# Patient Record
Sex: FEMALE | Race: BLACK OR AFRICAN AMERICAN | NOT HISPANIC OR LATINO | Employment: PART TIME | URBAN - METROPOLITAN AREA
[De-identification: names, ages, dates, MRNs, and addresses within clinical notes are randomized per-mention and may not be internally consistent; named-entity substitution may affect disease eponyms.]

---

## 2017-03-21 ENCOUNTER — HISTORICAL (OUTPATIENT)
Dept: RADIOLOGY | Facility: HOSPITAL | Age: 47
End: 2017-03-21

## 2022-04-11 ENCOUNTER — HISTORICAL (OUTPATIENT)
Dept: ADMINISTRATIVE | Facility: HOSPITAL | Age: 52
End: 2022-04-11
Payer: MEDICAID

## 2022-04-27 VITALS
BODY MASS INDEX: 23.79 KG/M2 | WEIGHT: 156.94 LBS | SYSTOLIC BLOOD PRESSURE: 126 MMHG | HEIGHT: 68 IN | DIASTOLIC BLOOD PRESSURE: 78 MMHG

## 2022-06-03 ENCOUNTER — HOSPITAL ENCOUNTER (EMERGENCY)
Facility: HOSPITAL | Age: 52
Discharge: HOME | End: 2022-06-03
Payer: MEDICAID

## 2022-06-03 VITALS
HEIGHT: 70 IN | HEART RATE: 62 BPM | BODY MASS INDEX: 23.62 KG/M2 | SYSTOLIC BLOOD PRESSURE: 178 MMHG | DIASTOLIC BLOOD PRESSURE: 92 MMHG | WEIGHT: 165 LBS | OXYGEN SATURATION: 99 % | RESPIRATION RATE: 17 BRPM

## 2022-06-03 DIAGNOSIS — H10.89 GIANT PAPILLARY CONJUNCTIVITIS: Primary | ICD-10-CM

## 2022-06-03 PROCEDURE — 25000003 PHARM REV CODE 250: Performed by: STUDENT IN AN ORGANIZED HEALTH CARE EDUCATION/TRAINING PROGRAM

## 2022-06-03 PROCEDURE — 99283 EMERGENCY DEPT VISIT LOW MDM: CPT

## 2022-06-03 RX ORDER — PROPARACAINE HYDROCHLORIDE 5 MG/ML
1 SOLUTION/ DROPS OPHTHALMIC
Status: COMPLETED | OUTPATIENT
Start: 2022-06-03 | End: 2022-06-03

## 2022-06-03 RX ORDER — NEOMYCIN SULFATE, POLYMYXIN B SULFATE, AND DEXAMETHASONE 3.5; 10000; 1 MG/G; [USP'U]/G; MG/G
OINTMENT OPHTHALMIC EVERY 6 HOURS
Qty: 3.5 G | Refills: 0 | Status: SHIPPED | OUTPATIENT
Start: 2022-06-03 | End: 2022-06-07

## 2022-06-03 RX ADMIN — PROPARACAINE HYDROCHLORIDE 1 DROP: 5 SOLUTION/ DROPS OPHTHALMIC at 03:06

## 2022-06-03 RX ADMIN — FLUORESCEIN SODIUM 1 EACH: 1 STRIP OPHTHALMIC at 03:06

## 2022-06-03 NOTE — ED PROVIDER NOTES
Encounter Date: 6/3/2022       History     Chief Complaint   Patient presents with    Eye Pain     Pt states woke up this morning c/o cloudy vision in both eyes.  States put contacts into eyes this morning and started to have burning in both eyes. Denies any trauma, states feels like something is in both of her eyes.       HPI  Review of patient's allergies indicates:  No Known Allergies  Past Medical History:   Diagnosis Date    Cervical cancer      Past Surgical History:   Procedure Laterality Date     SECTION, CLASSIC      HYSTERECTOMY       History reviewed. No pertinent family history.  Social History     Tobacco Use    Smoking status: Never Smoker    Smokeless tobacco: Never Used   Substance Use Topics    Alcohol use: Never    Drug use: Never     Review of Systems    Physical Exam     Initial Vitals [22 1339]   BP Pulse Resp Temp SpO2   (!) 178/92 62 17 -- 99 %      MAP       --         Physical Exam    ED Course   Procedures  Labs Reviewed - No data to display       Imaging Results    None          Medications   fluorescein ophthalmic strip 1 each (1 each Both Eyes Given 6/3/22 1515)   proparacaine 0.5 % ophthalmic solution 1 drop (1 drop Both Eyes Given 6/3/22 1515)     Medical Decision Making:   Initial Assessment:   Eye injury conjunctivitis  Differential Diagnosis:   Irritant conjunctivitis, bacterial conjunctivitis, joint palpebral conjunctivitis.  Bacterial conjunctivitis.  Contact lens wearer ulcer.  Abrasion from contact lens.  Foreign body in eye.            Attending Attestation:   Physician Attestation Statement for Resident:  As the supervising MD   Physician Attestation Statement: I have personally seen and examined this patient.   I agree with the above history. -: This lady is a contact lens wearer she got up this morning her eyes were burning and itching in hurting contacts new pair been in since about .  She took her contacts out eyes continued to burn she washed her  eyes with saline it helped but eyes is still red injected and burning she has no chronic medical issue she is normally very healthy she has never had a corneal ulcer her visual acuity was corrected is 20 50 in both eyes wearing her eyeglasses.  She denies being around any substances that could have hurt her eyes dust  -: Ing cetera.  No trauma to eyes.  No high-speed rotary objects.  Very pleasant lady GCS 15 visual acuity right eye corrected with eyeglasses 20 50 visual acuity left eye corrected with eyeglasses 20 50.  The patient has a pressure in the right eye of 23 and a pressure in the left eye of 21. She has markedly injected palpebral conjunctiva bilaterally.  Pupils are round and briskly reactive extraocular motions are intact with floor seen and sent lab no obvious foreign bodies are seen there is a rather bumpy pattern to the palpebral conjunctiva.  There is no evidence of any acute posterior injury.  With limited funduscopic exam done on dilated.  Heart is regular rate and rhythm lungs are clear there is no submandibular adenopathy there is no pre clear adenopathy heart is regular rate and rhythm normal TMs nose and oropharynx.     As the supervising MD I agree with the above treatment, course, plan, and disposition.   -: The resident and I discussed differential diagnosis for this ulcer conjunctivitis palpebral conjunctivitis.  Allergic reaction to new substances.  Excedrin we discussed the medical decision making at length.  I have extensive input into the medical decision-making patient will be discharged home after consultation with the ophthalmologist.  Patient will get Maxitrol drops.  Until re-evaluated on Monday morning every 6 hours part of the rib the patient is prepared for release.  Questions are asked and answered.  Impression conjunctivitis bilateral eyes and contact lens wearer.  Patient is discharged in stable condition I have physically seen evaluated the patient I concur with the  documentation as in this chart above.                         Clinical Impression:   Final diagnoses:  [H10.89] Giant papillary conjunctivitis (Primary)          ED Disposition Condition    Discharge Stable        ED Prescriptions     Medication Sig Dispense Start Date End Date Auth. Provider    neomycin-polymyxin-dexamethasone (MAXITROL) 3.5 mg/g-10,000 unit/g-0.1 % Oint Place into both eyes every 6 (six) hours. for 4 days 3.5 g 6/3/2022 6/7/2022 Yulissa Aiken MD        Follow-up Information     Follow up With Specialties Details Why Contact Info    Mali Price MD Ophthalmology Go on 6/6/2022 @ 07:30 [facility] [address] [address]  [phone]             Felice Sanchez MD  06/03/22 3671

## 2022-06-03 NOTE — ED PROVIDER NOTES
Encounter Date: 6/3/2022       History     Chief Complaint   Patient presents with    Eye Pain     Pt states woke up this morning c/o cloudy vision in both eyes.  States put contacts into eyes this morning and started to have burning in both eyes. Denies any trauma, states feels like something is in both of her eyes.       HPI   50yo F presents to the ED complaining of foreign body sensation in the bilateral eyes. Reports waking up this morning with no problems with her eyes. When she placed her contacts in her eyes she began to have a burning sensation and reports her sclera being red, she removed the contacts and reports noticing a white film over her eyes, with mild blurred vision. She placed some saline drops in her eyes with no relief, denies ever having symptoms similar to this before. Usually changes contacts monthly, last changed about 3 days ago, denies sleeping in contacts, denies any new contact solutions and always washes hands prior to inserting contacts.    PMH: No medical Hx per patient  Surg Hx:  Section x1, Hysterectomy  Allergies: NKDA  Meds: No medications at home per patient  Social Hx: Denies EtOH, tobacco, or illicit drug use.    Review of patient's allergies indicates:  No Known Allergies  Past Medical History:   Diagnosis Date    Cervical cancer      Past Surgical History:   Procedure Laterality Date     SECTION, CLASSIC      HYSTERECTOMY       History reviewed. No pertinent family history.  Social History     Tobacco Use    Smoking status: Never Smoker    Smokeless tobacco: Never Used   Substance Use Topics    Alcohol use: Never    Drug use: Never     Review of Systems   Constitutional: Negative for chills and fever.   HENT: Negative for ear discharge, ear pain, sinus pressure and sinus pain.    Eyes: Positive for pain, redness and visual disturbance. Negative for photophobia, discharge and itching.   Respiratory: Negative for shortness of breath.    Cardiovascular:  Negative for chest pain and palpitations.   Gastrointestinal: Negative for nausea and vomiting.   Skin: Negative for rash and wound.   Neurological: Negative for dizziness, syncope, light-headedness and headaches.   All other systems reviewed and are negative.      Physical Exam     Initial Vitals [06/03/22 1339]   BP Pulse Resp Temp SpO2   (!) 178/92 62 17 -- 99 %      MAP       --         Physical Exam    Constitutional: She appears well-developed. No distress.   HENT:   Head: Normocephalic and atraumatic.   Eyes: EOM are normal. Pupils are equal, round, and reactive to light. Lids are everted and swept, no foreign bodies found. Right conjunctiva is injected. Left conjunctiva is injected. Scleral icterus is present.   Fine bumps on superior internal eyelid bilateral with bilateral conjunctival injection  Corrected Visual Acuity: 20/50 bilaterally  Fluorescein Exam: No pooling of fluorescein, no corneal ulcers or abrasions appreciated  Occular Pressure: L 21; R 23  Slit Lamp Exam: cornea clear bilaterally, no clouding     Neck: Neck supple. No thyromegaly present.   Cardiovascular: Normal rate, regular rhythm, normal heart sounds and intact distal pulses.   Pulmonary/Chest: Breath sounds normal. No respiratory distress.   Musculoskeletal:         General: Normal range of motion.      Cervical back: Neck supple.     Lymphadenopathy:     She has no cervical adenopathy.   Neurological: She is alert and oriented to person, place, and time.         ED Course   Procedures  Labs Reviewed - No data to display       Imaging Results    None          Medications   fluorescein ophthalmic strip 1 each (1 each Both Eyes Given 6/3/22 1515)   proparacaine 0.5 % ophthalmic solution 1 drop (1 drop Both Eyes Given 6/3/22 1515)     Medical Decision Making:   Initial Assessment:   50yo F presents for burning and foreign body sensation in bilateral eyes following contact placement. Symptoms improved since taking contacts out.    Differential Diagnosis:   Corneal ulcer, Corneal abrasion, Giant Papillary Conjunctivitis  ED Management:  Patient had visual acuity with corrective lenses 20/50 bilaterally, she had a fluorescein and slit lamp exam that was benign, and occular pressures were just slightly >20 bilaterally. Spoke with ophthalmologist on call Dr. Mali Price, who recommended Maxitrol drops QID in both eyes and to follow up in her office on Monday 06/06/2022 at 07:30                      Clinical Impression:   Final diagnoses:  [H10.89] Giant papillary conjunctivitis (Primary)          ED Disposition Condition    Discharge Stable        ED Prescriptions     None        Follow-up Information     Follow up With Specialties Details Why Contact Info    Mali Price MD Ophthalmology Go on 6/6/2022 @ 07:30 [address] [address]  [phone]             Yulissa Aiken MD  Resident  06/03/22 4657       Yulissa Aiken MD  Resident  06/03/22 9541

## 2022-06-03 NOTE — FIRST PROVIDER EVALUATION
Medical screening exam completed.  I have conducted a focused provider triage encounter, findings are as follows:    Brief history of present illness:  51 year old female presents to ED for cloudy vision to bilateral eyes that began this morning after putting contacts in; Denies injury/trauma     There were no vitals filed for this visit.    Pertinent physical exam:  Alert, awake    Brief workup plan:  Eye exam                                                                                                                                                                    Preliminary workup initiated; this workup will be continued and followed by the physician or advanced practice provider that is assigned to the patient when roomed.

## 2022-10-03 ENCOUNTER — HOSPITAL ENCOUNTER (EMERGENCY)
Facility: HOSPITAL | Age: 52
Discharge: HOME | End: 2022-10-03
Attending: EMERGENCY MEDICINE
Payer: MEDICAID

## 2022-10-03 VITALS
TEMPERATURE: 99 F | RESPIRATION RATE: 16 BRPM | BODY MASS INDEX: 24.96 KG/M2 | HEART RATE: 80 BPM | OXYGEN SATURATION: 98 % | WEIGHT: 164.69 LBS | DIASTOLIC BLOOD PRESSURE: 82 MMHG | HEIGHT: 68 IN | SYSTOLIC BLOOD PRESSURE: 148 MMHG

## 2022-10-03 DIAGNOSIS — I10 HYPERTENSION, UNSPECIFIED TYPE: Primary | ICD-10-CM

## 2022-10-03 PROCEDURE — 99282 EMERGENCY DEPT VISIT SF MDM: CPT

## 2022-10-03 NOTE — DISCHARGE INSTRUCTIONS
Keep scheduled appointment with your PCP as planned.  Present to nearest ED if your pressure is elevated and you have headache, blurred vision, or dizziness.

## 2022-10-03 NOTE — ED PROVIDER NOTES
Encounter Date: 10/3/2022       History     Chief Complaint   Patient presents with    Hypertension     Pt states that she woke up this morning and her BP was in the 180's before she took her medicines. Pt recently diagnosed with hypertension. Pt denies HA or blurred vision.     Pt is a 52 y.o. female who presents to the [facility] ED for evaluation of her blood pressure. Reports being placed on hypertension medication a few weeks ago and noticed that her blood pressure was elevated this AM after taking her medication. Denies SOB, weakness, dizziness, chest pain, blurry vision, or headache. Current /82. Pt reports already having a scheduled follow up with her PCP to evaluate how the medication is working.     Review of patient's allergies indicates:  No Known Allergies  Past Medical History:   Diagnosis Date    Cervical cancer     Hypertension      Past Surgical History:   Procedure Laterality Date     SECTION, CLASSIC      HYSTERECTOMY       No family history on file.  Social History     Tobacco Use    Smoking status: Never    Smokeless tobacco: Never   Substance Use Topics    Alcohol use: Never    Drug use: Never     Review of Systems   Constitutional:  Negative for chills, diaphoresis, fatigue and fever.   HENT:  Negative for facial swelling, rhinorrhea, sinus pressure, sinus pain, sore throat and trouble swallowing.    Respiratory:  Negative for cough, chest tightness, shortness of breath and wheezing.    Cardiovascular:  Negative for chest pain, palpitations and leg swelling.   Gastrointestinal:  Negative for abdominal pain, diarrhea, nausea and vomiting.   Genitourinary:  Negative for dysuria, flank pain, frequency, hematuria and urgency.   Musculoskeletal:  Negative for arthralgias, back pain, joint swelling and myalgias.   Skin:  Negative for color change and rash.   Neurological:  Negative for dizziness, syncope, weakness and light-headedness.   Hematological:  Does not bruise/bleed easily.   All  other systems reviewed and are negative.    Physical Exam     Initial Vitals [10/03/22 1135]   BP Pulse Resp Temp SpO2   (!) 164/82 72 18 99 °F (37.2 °C) 98 %      MAP       --         Physical Exam    Nursing note and vitals reviewed.  Constitutional: She appears well-developed and well-nourished.   HENT:   Head: Normocephalic and atraumatic.   Nose: Nose normal.   Mouth/Throat: Oropharynx is clear and moist.   Eyes: Conjunctivae and EOM are normal. Pupils are equal, round, and reactive to light.   Neck: Neck supple.   Normal range of motion.  Cardiovascular:  Normal rate, regular rhythm, normal heart sounds and intact distal pulses.           Pulmonary/Chest: Effort normal and breath sounds normal. No respiratory distress. She has no wheezes. She has no rhonchi. She has no rales. She exhibits no tenderness.   Abdominal: Abdomen is soft and flat. Bowel sounds are normal. She exhibits no distension. There is no abdominal tenderness. There is no rebound, no guarding, no tenderness at McBurney's point and negative Weldon's sign. negative psoas sign  Musculoskeletal:         General: Normal range of motion.      Cervical back: Normal range of motion and neck supple.     Neurological: She is alert and oriented to person, place, and time. She has normal strength and normal reflexes.   Skin: Skin is warm and dry. Capillary refill takes less than 2 seconds.   Psychiatric: She has a normal mood and affect. Her speech is normal and behavior is normal. Judgment and thought content normal.       ED Course   Procedures  Labs Reviewed - No data to display       Imaging Results    None          Medications - No data to display  Medical Decision Making:   Differential Diagnosis:   HTN  ED Management:  12:28 PM Reassessed patient at this time. Current pt /81. Pt continues to deny headache, dizziness, or blurred vision. Stressed the need for pt to check her blood pressure at the same time daily and appropriate weight times  after taking her medication to ensure accurate blood pressure readings. Pt is to present to nearest ED immediately for headache, dizziness, or blurred vision if her blood pressure is found to be elevated. She is to keep next appointment with her PCP as planned. Discussed with patient all pertinent ED information and results. Discussed diagnosis and treatment plan with patient. Follow up instructions and return to ED instruction have been given. All questions and concerns were addressed at this time. Patient voices understanding of information and instructions. Patient is comfortable with plan and discharge. Patient is stable for discharge.                           Clinical Impression:   Final diagnoses:  [I10] Hypertension, unspecified type (Primary)      ED Disposition Condition    Discharge Stable          ED Prescriptions    None       Follow-up Information       Follow up With Specialties Details Why Contact Info    [facility] - Emergency Dept Emergency Medicine In 3 days As needed, If symptoms worsen [address] [facility] [id number] [phone]             Sony Dodd Jr., FNP  10/03/22 1231

## 2023-04-04 ENCOUNTER — PATIENT MESSAGE (OUTPATIENT)
Dept: RESEARCH | Facility: HOSPITAL | Age: 53
End: 2023-04-04
Payer: MEDICAID